# Patient Record
Sex: FEMALE | Race: WHITE | NOT HISPANIC OR LATINO | URBAN - METROPOLITAN AREA
[De-identification: names, ages, dates, MRNs, and addresses within clinical notes are randomized per-mention and may not be internally consistent; named-entity substitution may affect disease eponyms.]

---

## 2019-02-11 ENCOUNTER — EMERGENCY (EMERGENCY)
Facility: HOSPITAL | Age: 49
LOS: 1 days | Discharge: HOME | End: 2019-02-11
Attending: EMERGENCY MEDICINE
Payer: COMMERCIAL

## 2019-02-11 VITALS
SYSTOLIC BLOOD PRESSURE: 133 MMHG | TEMPERATURE: 98 F | RESPIRATION RATE: 20 BRPM | WEIGHT: 199.96 LBS | OXYGEN SATURATION: 98 % | HEART RATE: 113 BPM | HEIGHT: 63 IN | DIASTOLIC BLOOD PRESSURE: 64 MMHG

## 2019-02-11 PROCEDURE — 70450 CT HEAD/BRAIN W/O DYE: CPT

## 2019-02-11 PROCEDURE — 99284 EMERGENCY DEPT VISIT MOD MDM: CPT | Mod: 25

## 2019-02-11 PROCEDURE — 99284 EMERGENCY DEPT VISIT MOD MDM: CPT

## 2019-02-11 PROCEDURE — 70450 CT HEAD/BRAIN W/O DYE: CPT | Mod: 26

## 2019-02-11 RX ORDER — OMEPRAZOLE 10 MG/1
1 CAPSULE, DELAYED RELEASE ORAL
Qty: 0 | Refills: 0 | COMMUNITY

## 2019-02-11 RX ORDER — ALPRAZOLAM 0.25 MG
0.25 TABLET ORAL ONCE
Qty: 0 | Refills: 0 | Status: DISCONTINUED | OUTPATIENT
Start: 2019-02-11 | End: 2019-02-11

## 2019-02-11 RX ORDER — ACETAMINOPHEN 500 MG
650 TABLET ORAL ONCE
Qty: 0 | Refills: 0 | Status: COMPLETED | OUTPATIENT
Start: 2019-02-11 | End: 2019-02-11

## 2019-02-11 RX ORDER — ALPRAZOLAM 0.25 MG
1 TABLET ORAL
Qty: 0 | Refills: 0 | COMMUNITY

## 2019-02-11 RX ADMIN — Medication 0.25 MILLIGRAM(S): at 22:37

## 2019-02-11 RX ADMIN — Medication 650 MILLIGRAM(S): at 22:37

## 2019-02-11 NOTE — ED ADULT NURSE NOTE - NSIMPLEMENTINTERV_GEN_ALL_ED
Implemented All Universal Safety Interventions:  [place] to call system. Call bell, personal items and telephone within reach. Instruct patient to call for assistance. Room bathroom lighting operational. Non-slip footwear when patient is off stretcher. Physically safe environment: no spills, clutter or unnecessary equipment. Stretcher in lowest position, wheels locked, appropriate side rails in place.

## 2019-02-11 NOTE — ED STATDOCS - OBJECTIVE STATEMENT
47 y/o F pt with PMHx tubal ligation, anxiety presents to the ED c/o worsening headache beginning one week ago.  Pt reports posterior HA and sinus pressure.  She took her BP today and was hypertensive, she called her PMD, and was told to take a xanax 0.25mg.  Pt follows up with a PMD for her headaches, has been told it may be related to her menopause, and told to take Excedrin.  She notes her HAs are typically intermittent, but her current HA is more severe and has lasted longer than usual.  She notes increased personal stress this past week, including financial stress, her daughter's sweet 16, and upcoming school prom.  Denies abdominal pain, fever, chills, N/V, CP, SOB.  No further acute complaints at this time.

## 2019-02-11 NOTE — ED STATDOCS - PROGRESS NOTE DETAILS
PT evaluated by intake physician. HPI/PE/ROS as noted above. Will follow up plan per intake physician. Reviewed ct results, neuro referral, pt explained results and d/c instructions

## 2019-02-11 NOTE — ED STATDOCS - NS_ ATTENDINGSCRIBEDETAILS _ED_A_ED_FT
I, Nolan Marie, performed the initial face to face bedside interview with this patient regarding history of present illness, review of symptoms and relevant past medical, social and family history.  I completed an independent physical examination.  I was the initial provider who evaluated this patient. I have signed out the follow up of any pending tests (i.e. labs, radiological studies) to the ACP.  I have communicated the patient’s plan of care and disposition with the ACP.  The history, relevant review of systems, past medical and surgical history, medical decision making, and physical examination was documented by the scribe in my presence and I attest to the accuracy of the documentation.

## 2019-02-11 NOTE — ED ADULT TRIAGE NOTE - CHIEF COMPLAINT QUOTE
Pin in the back of neck x 1 week my b/p is high and headache taking excedrin for pain has anxiety took xanax

## 2019-02-11 NOTE — ED ADULT NURSE NOTE - OBJECTIVE STATEMENT
Pt. complaining of headache x 1 week.  Pt. states pain began last monday and is located in the posterior lower head and forehead.  Pt. states pain is temporarily relieved with excedrin but then returns.  Pt. denies vision changes, dizziness or lightheaded.  Pt. able to tolerate lights and sounds appropriately.  Pt. states "I thought it was hormonal because I havent gotten my period since september and would get a headache during the weeks I would usually get my period but this one is worse because now it is in the back of my head."

## 2021-06-11 NOTE — ED ADULT NURSE NOTE - GASTROINTESTINAL ASSESSMENT
Ruiz had virtual visit with Dr. Zuñiga on 9-22-20.  Dr. Zuñiga would like her to come in for labs, blood pressure check and flu shot.      I called and left a message for her to call back to schedule.    WDL

## 2021-09-13 NOTE — ED STATDOCS - NS ED MD DISPO DISCHARGE
Called patient and conveyed results per Dr. Chaparro on patient's mobile voicemail. Left contact information if patient has any further questions or concerns.      Home

## 2024-02-26 NOTE — ED ADULT NURSE NOTE - NS ED NURSE LEVEL OF CONSCIOUSNESS ORIENTATION
Spoke with patients father regarding negative results. No further questions.    Oriented - self; Oriented - place; Oriented - time